# Patient Record
Sex: MALE | Race: BLACK OR AFRICAN AMERICAN | NOT HISPANIC OR LATINO | Employment: OTHER | URBAN - METROPOLITAN AREA
[De-identification: names, ages, dates, MRNs, and addresses within clinical notes are randomized per-mention and may not be internally consistent; named-entity substitution may affect disease eponyms.]

---

## 2019-04-21 ENCOUNTER — HOSPITAL ENCOUNTER (EMERGENCY)
Facility: HOSPITAL | Age: 34
Discharge: HOME | End: 2019-04-21
Attending: EMERGENCY MEDICINE | Admitting: EMERGENCY MEDICINE
Payer: MEDICARE

## 2019-04-21 VITALS
DIASTOLIC BLOOD PRESSURE: 73 MMHG | RESPIRATION RATE: 18 BRPM | OXYGEN SATURATION: 100 % | SYSTOLIC BLOOD PRESSURE: 117 MMHG | WEIGHT: 149.03 LBS | HEART RATE: 64 BPM | TEMPERATURE: 98 F

## 2019-04-21 DIAGNOSIS — N48.32 PRIAPISM DUE TO SICKLE CELL DISEASE (HCC): Primary | ICD-10-CM

## 2019-04-21 DIAGNOSIS — D57.1 PRIAPISM DUE TO SICKLE CELL DISEASE (HCC): Primary | ICD-10-CM

## 2019-04-21 LAB
ANION GAP SERPL CALCULATED.3IONS-SCNC: 14 MMOL/L (ref 4–13)
BASOPHILS # BLD AUTO: 0.05 THOUSANDS/ΜL (ref 0–0.1)
BASOPHILS NFR BLD AUTO: 0 % (ref 0–1)
BUN SERPL-MCNC: 6 MG/DL (ref 5–25)
CALCIUM SERPL-MCNC: 9 MG/DL (ref 8.3–10.1)
CHLORIDE SERPL-SCNC: 101 MMOL/L (ref 100–108)
CO2 SERPL-SCNC: 19 MMOL/L (ref 21–32)
CREAT SERPL-MCNC: 1.06 MG/DL (ref 0.6–1.3)
EOSINOPHIL # BLD AUTO: 0.06 THOUSAND/ΜL (ref 0–0.61)
EOSINOPHIL NFR BLD AUTO: 0 % (ref 0–6)
ERYTHROCYTE [DISTWIDTH] IN BLOOD BY AUTOMATED COUNT: 15.9 % (ref 11.6–15.1)
GFR SERPL CREATININE-BSD FRML MDRD: 106 ML/MIN/1.73SQ M
GLUCOSE SERPL-MCNC: 139 MG/DL (ref 65–140)
HCT VFR BLD AUTO: 32.5 % (ref 36.5–49.3)
HGB BLD-MCNC: 11.2 G/DL (ref 12–17)
IMM GRANULOCYTES # BLD AUTO: 0.08 THOUSAND/UL (ref 0–0.2)
IMM GRANULOCYTES NFR BLD AUTO: 1 % (ref 0–2)
LYMPHOCYTES # BLD AUTO: 3.32 THOUSANDS/ΜL (ref 0.6–4.47)
LYMPHOCYTES NFR BLD AUTO: 23 % (ref 14–44)
MCH RBC QN AUTO: 29.5 PG (ref 26.8–34.3)
MCHC RBC AUTO-ENTMCNC: 34.5 G/DL (ref 31.4–37.4)
MCV RBC AUTO: 86 FL (ref 82–98)
MONOCYTES # BLD AUTO: 1.66 THOUSAND/ΜL (ref 0.17–1.22)
MONOCYTES NFR BLD AUTO: 12 % (ref 4–12)
NEUTROPHILS # BLD AUTO: 9.12 THOUSANDS/ΜL (ref 1.85–7.62)
NEUTS SEG NFR BLD AUTO: 64 % (ref 43–75)
NRBC BLD AUTO-RTO: 1 /100 WBCS
PLATELET # BLD AUTO: 305 THOUSANDS/UL (ref 149–390)
PMV BLD AUTO: 10.3 FL (ref 8.9–12.7)
POTASSIUM SERPL-SCNC: 4.1 MMOL/L (ref 3.5–5.3)
RBC # BLD AUTO: 3.8 MILLION/UL (ref 3.88–5.62)
RETICS # AUTO: ABNORMAL 10*3/UL (ref 14356–105094)
RETICS # CALC: 4.45 % (ref 0.37–1.87)
SODIUM SERPL-SCNC: 134 MMOL/L (ref 136–145)
WBC # BLD AUTO: 14.29 THOUSAND/UL (ref 4.31–10.16)

## 2019-04-21 PROCEDURE — 36415 COLL VENOUS BLD VENIPUNCTURE: CPT | Performed by: EMERGENCY MEDICINE

## 2019-04-21 PROCEDURE — 96372 THER/PROPH/DIAG INJ SC/IM: CPT

## 2019-04-21 PROCEDURE — 96376 TX/PRO/DX INJ SAME DRUG ADON: CPT

## 2019-04-21 PROCEDURE — 85045 AUTOMATED RETICULOCYTE COUNT: CPT | Performed by: EMERGENCY MEDICINE

## 2019-04-21 PROCEDURE — 96374 THER/PROPH/DIAG INJ IV PUSH: CPT

## 2019-04-21 PROCEDURE — 54220 IRRG CRPRA CAVRNOSA PRIAPISM: CPT | Performed by: EMERGENCY MEDICINE

## 2019-04-21 PROCEDURE — 99284 EMERGENCY DEPT VISIT MOD MDM: CPT | Performed by: EMERGENCY MEDICINE

## 2019-04-21 PROCEDURE — 80048 BASIC METABOLIC PNL TOTAL CA: CPT | Performed by: EMERGENCY MEDICINE

## 2019-04-21 PROCEDURE — 85025 COMPLETE CBC W/AUTO DIFF WBC: CPT | Performed by: EMERGENCY MEDICINE

## 2019-04-21 PROCEDURE — 99284 EMERGENCY DEPT VISIT MOD MDM: CPT

## 2019-04-21 RX ORDER — HYDROMORPHONE HCL/PF 1 MG/ML
1 SYRINGE (ML) INJECTION ONCE
Status: COMPLETED | OUTPATIENT
Start: 2019-04-21 | End: 2019-04-21

## 2019-04-21 RX ORDER — LIDOCAINE HYDROCHLORIDE 10 MG/ML
10 INJECTION, SOLUTION EPIDURAL; INFILTRATION; INTRACAUDAL; PERINEURAL ONCE
Status: COMPLETED | OUTPATIENT
Start: 2019-04-21 | End: 2019-04-21

## 2019-04-21 RX ORDER — TERBUTALINE SULFATE 1 MG/ML
0.25 INJECTION, SOLUTION SUBCUTANEOUS ONCE
Status: COMPLETED | OUTPATIENT
Start: 2019-04-21 | End: 2019-04-21

## 2019-04-21 RX ADMIN — TERBUTALINE SULFATE 0.25 MG: 1 INJECTION, SOLUTION SUBCUTANEOUS at 13:24

## 2019-04-21 RX ADMIN — HYDROMORPHONE HYDROCHLORIDE 1 MG: 1 INJECTION, SOLUTION INTRAMUSCULAR; INTRAVENOUS; SUBCUTANEOUS at 12:54

## 2019-04-21 RX ADMIN — Medication 500 MCG: at 13:51

## 2019-04-21 RX ADMIN — HYDROMORPHONE HYDROCHLORIDE 1 MG: 1 INJECTION, SOLUTION INTRAMUSCULAR; INTRAVENOUS; SUBCUTANEOUS at 13:27

## 2019-04-21 RX ADMIN — LIDOCAINE HYDROCHLORIDE 10 ML: 10 INJECTION, SOLUTION EPIDURAL; INFILTRATION; INTRACAUDAL; PERINEURAL at 13:30

## 2019-10-26 ENCOUNTER — APPOINTMENT (EMERGENCY)
Dept: CT IMAGING | Facility: HOSPITAL | Age: 34
End: 2019-10-26

## 2019-10-26 ENCOUNTER — APPOINTMENT (EMERGENCY)
Dept: RADIOLOGY | Facility: HOSPITAL | Age: 34
End: 2019-10-26

## 2019-10-26 ENCOUNTER — HOSPITAL ENCOUNTER (EMERGENCY)
Facility: HOSPITAL | Age: 34
Discharge: HOME | End: 2019-10-27
Attending: EMERGENCY MEDICINE | Admitting: EMERGENCY MEDICINE

## 2019-10-26 VITALS
HEART RATE: 78 BPM | OXYGEN SATURATION: 100 % | TEMPERATURE: 97.8 F | SYSTOLIC BLOOD PRESSURE: 113 MMHG | WEIGHT: 144.18 LBS | DIASTOLIC BLOOD PRESSURE: 69 MMHG | RESPIRATION RATE: 16 BRPM
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
## 2019-10-26 DIAGNOSIS — S09.90XA INJURY OF HEAD, INITIAL ENCOUNTER: ICD-10-CM

## 2019-10-26 DIAGNOSIS — S63.90XA HAND SPRAIN: ICD-10-CM

## 2019-10-26 DIAGNOSIS — S16.1XXA STRAIN OF NECK MUSCLE, INITIAL ENCOUNTER: ICD-10-CM

## 2019-10-26 PROCEDURE — 99284 EMERGENCY DEPT VISIT MOD MDM: CPT | Performed by: EMERGENCY MEDICINE

## 2019-10-26 PROCEDURE — 70450 CT HEAD/BRAIN W/O DYE: CPT

## 2019-10-26 PROCEDURE — 72125 CT NECK SPINE W/O DYE: CPT

## 2019-10-26 PROCEDURE — 99284 EMERGENCY DEPT VISIT MOD MDM: CPT

## 2019-10-26 PROCEDURE — 71101 X-RAY EXAM UNILAT RIBS/CHEST: CPT

## 2019-10-26 RX ORDER — FOLIC ACID 1 MG/1
TABLET ORAL DAILY
COMMUNITY

## 2019-10-26 RX ORDER — ACETAMINOPHEN 325 MG/1
650 TABLET ORAL ONCE
Status: COMPLETED | OUTPATIENT
Start: 2019-10-26 | End: 2019-10-26

## 2019-10-26 RX ORDER — IBUPROFEN 600 MG/1
600 TABLET ORAL ONCE
Status: COMPLETED | OUTPATIENT
Start: 2019-10-26 | End: 2019-10-26

## 2019-10-26 RX ORDER — OXYCODONE AND ACETAMINOPHEN 10; 325 MG/1; MG/1
1 TABLET ORAL EVERY 4 HOURS PRN
COMMUNITY

## 2019-10-26 RX ADMIN — ACETAMINOPHEN 650 MG: 325 TABLET ORAL at 23:14

## 2019-10-26 RX ADMIN — IBUPROFEN 600 MG: 600 TABLET, FILM COATED ORAL at 23:41

## 2019-10-27 ENCOUNTER — APPOINTMENT (EMERGENCY)
Dept: RADIOLOGY | Facility: HOSPITAL | Age: 34
End: 2019-10-27

## 2019-10-27 ENCOUNTER — APPOINTMENT (EMERGENCY)
Dept: CT IMAGING | Facility: HOSPITAL | Age: 34
End: 2019-10-27

## 2019-10-27 PROCEDURE — 73130 X-RAY EXAM OF HAND: CPT

## 2019-10-27 PROCEDURE — 70486 CT MAXILLOFACIAL W/O DYE: CPT

## 2019-10-27 RX ORDER — IBUPROFEN 600 MG/1
600 TABLET ORAL EVERY 6 HOURS PRN
Qty: 30 TABLET | Refills: 0 | Status: SHIPPED | OUTPATIENT
Start: 2019-10-27

## 2019-10-27 RX ORDER — LIDOCAINE 50 MG/G
1 PATCH TOPICAL ONCE
Status: DISCONTINUED | OUTPATIENT
Start: 2019-10-27 | End: 2019-10-27 | Stop reason: HOSPADM

## 2019-10-27 RX ORDER — LIDOCAINE 50 MG/G
1 PATCH TOPICAL DAILY
Qty: 15 PATCH | Refills: 0 | Status: SHIPPED | OUTPATIENT
Start: 2019-10-27

## 2019-10-27 RX ADMIN — LIDOCAINE 1 PATCH: 50 PATCH TOPICAL at 01:31

## 2019-10-27 NOTE — ED NOTES
Prior to administration, the patient is stating that the tylenol "is not going to be strong enough"       Jasen Bose RN  10/26/19 4205

## 2019-10-27 NOTE — ED NOTES
The patient is requesting to speak with his doctor  He is stating that the tylenol did nothing for his pain       Pantera Douglas RN  10/26/19 4430

## 2019-10-27 NOTE — ED NOTES
RN was notified that the patient was in the hallway yelling  The patient was advised that we are waiting for the radiologist's report regarding his x-rays, as they have not yet come back  The patient stated "That's not true   she told me they were back right after she took the x-rays"  "I think you all are just holding it against me"  The patient was advised that he is receiving the same standard of care as other patients and that it can usually take anywhere from 30-40+ minutes for the radiology reports to return  The patient continually asked to speak with a doctor  He was advised that the provider was in another room with a patient, but would be over as soon as he was available  The provider was made aware of the situation       Eder Pedroza RN  10/27/19 9949

## 2019-10-27 NOTE — ED NOTES
Patient is not sitting on the edge of the bed stating that is pain is worse, rating it at a 9/10 on a 1-10 scale  Provider was made aware       Paul Lucas RN  10/27/19 8500
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
## 2019-10-27 NOTE — ED NOTES
Patient has removed the c-collar at this time  The patient was advised that if he did not leave the collar in place until his radiology scans were clear he could risk possible spinal injury  The patient stated that he "knew [he] was fine   I just had an x-ray"  The patient refused to put the collar back in place  "please miss just leave me alone   my head hurts and I am in pain  The provider was made aware of the situation       Yenifer Hernandez RN  10/27/19 0100

## 2019-10-27 NOTE — ED NOTES
Writer bedside to speak with patient, per request to file complaint  Patient upset that staff was "mumbling under their breath", reports that he could hear staff talking about him at nurses' station from lanier bed, stating "I'm the patient  I'm allowed to fucking complain  I'm paying their bills with my tax money"  Reports "You've gotta understand where I'm coming from  I have every right to curse and complain here    They don't"  Patient also upset that he was not prescribed "the right" pain medication  Shares hx of sickle cell disease, reports that he is chronically on narcotics, states "I take this shit all the time" (pulling bottle out of pants pocket)-pt unhappy that he was only prescribed "over the counter bullshit"  Writer and current ED provider addressed patient's non-improved pain status and lidocaine patch was recently applied  Writer apologized to patient that he felt like he was treated poorly today, but also expressed to patient that cursing is not tolerated in the hallways, and that medications must be ordered by a provider in order to be administered by nursing staff  Patient remains upset throughout  Contact information provided to patient to formally file complaint to [facility], [facility]  Patient reports "she's just a fucking nurses' assistant anyways    Don't worry    I'll see her on the road    The roads small    She gotta remember that"  Patient left department at this time with discharge instructions        Bibiana Zuniga RN  10/27/19 1069

## 2019-10-27 NOTE — ED NOTES
The patient can be heard mumbling and cursing in the hallway at this time       Alex Metz RN  10/27/19 3903

## 2020-06-27 ENCOUNTER — HOSPITAL ENCOUNTER (EMERGENCY)
Facility: HOSPITAL | Age: 35
Discharge: HOME | End: 2020-06-27
Attending: EMERGENCY MEDICINE
Payer: MEDICARE

## 2020-06-27 DIAGNOSIS — D57.1 PRIAPISM DUE TO SICKLE CELL DISEASE (HCC): Primary | ICD-10-CM

## 2020-06-27 DIAGNOSIS — N48.32 PRIAPISM DUE TO SICKLE CELL DISEASE (HCC): Primary | ICD-10-CM

## 2020-06-27 LAB
ANION GAP SERPL CALCULATED.3IONS-SCNC: 13 MMOL/L (ref 4–13)
BASOPHILS # BLD AUTO: 0.04 THOUSANDS/ΜL (ref 0–0.1)
BASOPHILS NFR BLD AUTO: 0 % (ref 0–1)
BUN SERPL-MCNC: 8 MG/DL (ref 5–25)
CALCIUM SERPL-MCNC: 8.8 MG/DL (ref 8.3–10.1)
CHLORIDE SERPL-SCNC: 96 MMOL/L (ref 100–108)
CO2 SERPL-SCNC: 23 MMOL/L (ref 21–32)
CREAT SERPL-MCNC: 0.83 MG/DL (ref 0.6–1.3)
EOSINOPHIL # BLD AUTO: 0.01 THOUSAND/ΜL (ref 0–0.61)
EOSINOPHIL NFR BLD AUTO: 0 % (ref 0–6)
ERYTHROCYTE [DISTWIDTH] IN BLOOD BY AUTOMATED COUNT: 14.6 % (ref 11.6–15.1)
GFR SERPL CREATININE-BSD FRML MDRD: 133 ML/MIN/1.73SQ M
GLUCOSE SERPL-MCNC: 91 MG/DL (ref 65–140)
HCT VFR BLD AUTO: 32.1 % (ref 36.5–49.3)
HGB BLD-MCNC: 11.6 G/DL (ref 12–17)
IMM GRANULOCYTES # BLD AUTO: 0.06 THOUSAND/UL (ref 0–0.2)
IMM GRANULOCYTES NFR BLD AUTO: 1 % (ref 0–2)
LYMPHOCYTES # BLD AUTO: 1.96 THOUSANDS/ΜL (ref 0.6–4.47)
LYMPHOCYTES NFR BLD AUTO: 18 % (ref 14–44)
MCH RBC QN AUTO: 32.2 PG (ref 26.8–34.3)
MCHC RBC AUTO-ENTMCNC: 36.1 G/DL (ref 31.4–37.4)
MCV RBC AUTO: 89 FL (ref 82–98)
MONOCYTES # BLD AUTO: 0.95 THOUSAND/ΜL (ref 0.17–1.22)
MONOCYTES NFR BLD AUTO: 9 % (ref 4–12)
NEUTROPHILS # BLD AUTO: 7.97 THOUSANDS/ΜL (ref 1.85–7.62)
NEUTS SEG NFR BLD AUTO: 72 % (ref 43–75)
NRBC BLD AUTO-RTO: 1 /100 WBCS
PLATELET # BLD AUTO: 252 THOUSANDS/UL (ref 149–390)
PMV BLD AUTO: 11.7 FL (ref 8.9–12.7)
POTASSIUM SERPL-SCNC: 3.6 MMOL/L (ref 3.5–5.3)
RBC # BLD AUTO: 3.6 MILLION/UL (ref 3.88–5.62)
SARS-COV-2 RNA RESP QL NAA+PROBE: NEGATIVE
SODIUM SERPL-SCNC: 132 MMOL/L (ref 136–145)
WBC # BLD AUTO: 10.99 THOUSAND/UL (ref 4.31–10.16)

## 2020-06-27 PROCEDURE — 85025 COMPLETE CBC W/AUTO DIFF WBC: CPT | Performed by: EMERGENCY MEDICINE

## 2020-06-27 PROCEDURE — 96374 THER/PROPH/DIAG INJ IV PUSH: CPT

## 2020-06-27 PROCEDURE — 96372 THER/PROPH/DIAG INJ SC/IM: CPT

## 2020-06-27 PROCEDURE — 36415 COLL VENOUS BLD VENIPUNCTURE: CPT | Performed by: EMERGENCY MEDICINE

## 2020-06-27 PROCEDURE — 99284 EMERGENCY DEPT VISIT MOD MDM: CPT

## 2020-06-27 PROCEDURE — 80048 BASIC METABOLIC PNL TOTAL CA: CPT | Performed by: EMERGENCY MEDICINE

## 2020-06-27 PROCEDURE — 87635 SARS-COV-2 COVID-19 AMP PRB: CPT | Performed by: EMERGENCY MEDICINE

## 2020-06-27 PROCEDURE — 96375 TX/PRO/DX INJ NEW DRUG ADDON: CPT

## 2020-06-27 PROCEDURE — 99285 EMERGENCY DEPT VISIT HI MDM: CPT | Performed by: EMERGENCY MEDICINE

## 2020-06-27 RX ORDER — MORPHINE SULFATE 4 MG/ML
4 INJECTION, SOLUTION INTRAMUSCULAR; INTRAVENOUS
Status: DISCONTINUED | OUTPATIENT
Start: 2020-06-27 | End: 2020-06-27

## 2020-06-27 RX ORDER — HYDROMORPHONE HCL/PF 1 MG/ML
1 SYRINGE (ML) INJECTION ONCE
Status: COMPLETED | OUTPATIENT
Start: 2020-06-27 | End: 2020-06-27

## 2020-06-27 RX ORDER — HYDROMORPHONE HCL/PF 1 MG/ML
0.5 SYRINGE (ML) INJECTION
Status: COMPLETED | OUTPATIENT
Start: 2020-06-27 | End: 2020-06-27

## 2020-06-27 RX ORDER — PSEUDOEPHEDRINE HCL 60 MG/1
60 TABLET ORAL AS NEEDED
Qty: 10 TABLET | Refills: 0 | Status: SHIPPED | OUTPATIENT
Start: 2020-06-27

## 2020-06-27 RX ORDER — TERBUTALINE SULFATE 1 MG/ML
0.5 INJECTION, SOLUTION SUBCUTANEOUS ONCE
Status: COMPLETED | OUTPATIENT
Start: 2020-06-27 | End: 2020-06-27

## 2020-06-27 RX ORDER — PSEUDOEPHEDRINE HCL 60 MG/1
60 TABLET ORAL ONCE
Status: COMPLETED | OUTPATIENT
Start: 2020-06-27 | End: 2020-06-27

## 2020-06-27 RX ORDER — MORPHINE SULFATE 4 MG/ML
10 INJECTION, SOLUTION INTRAMUSCULAR; INTRAVENOUS ONCE
Status: COMPLETED | OUTPATIENT
Start: 2020-06-27 | End: 2020-06-27

## 2020-06-27 RX ORDER — DIPHENHYDRAMINE HYDROCHLORIDE 50 MG/ML
25 INJECTION INTRAMUSCULAR; INTRAVENOUS ONCE
Status: COMPLETED | OUTPATIENT
Start: 2020-06-27 | End: 2020-06-27

## 2020-06-27 RX ADMIN — MORPHINE SULFATE 10 MG: 4 INJECTION INTRAVENOUS at 09:48

## 2020-06-27 RX ADMIN — PSEUDOEPHEDRINE HYDROCHLORIDE 60 MG: 60 TABLET ORAL at 09:52

## 2020-06-27 RX ADMIN — HYDROMORPHONE HYDROCHLORIDE 0.5 MG: 1 INJECTION, SOLUTION INTRAMUSCULAR; INTRAVENOUS; SUBCUTANEOUS at 11:06

## 2020-06-27 RX ADMIN — PHENYLEPHRINE HYDROCHLORIDE: 10 INJECTION INTRAVENOUS at 11:33

## 2020-06-27 RX ADMIN — TERBUTALINE SULFATE 0.5 MG: 1 INJECTION, SOLUTION SUBCUTANEOUS at 09:46

## 2020-06-27 RX ADMIN — DIPHENHYDRAMINE HYDROCHLORIDE 25 MG: 50 INJECTION, SOLUTION INTRAMUSCULAR; INTRAVENOUS at 10:03

## 2020-06-27 RX ADMIN — HYDROMORPHONE HYDROCHLORIDE 1 MG: 1 INJECTION, SOLUTION INTRAMUSCULAR; INTRAVENOUS; SUBCUTANEOUS at 10:37

## 2020-06-28 VITALS
DIASTOLIC BLOOD PRESSURE: 83 MMHG | HEART RATE: 87 BPM | SYSTOLIC BLOOD PRESSURE: 136 MMHG | TEMPERATURE: 98.6 F | WEIGHT: 144.18 LBS | RESPIRATION RATE: 16 BRPM | OXYGEN SATURATION: 94 %

## 2021-08-20 ENCOUNTER — APPOINTMENT (EMERGENCY)
Dept: RADIOLOGY | Facility: HOSPITAL | Age: 36
End: 2021-08-20
Payer: MEDICARE

## 2021-08-20 ENCOUNTER — HOSPITAL ENCOUNTER (EMERGENCY)
Facility: HOSPITAL | Age: 36
Discharge: HOME | End: 2021-08-20
Attending: EMERGENCY MEDICINE | Admitting: EMERGENCY MEDICINE
Payer: MEDICARE

## 2021-08-20 VITALS
RESPIRATION RATE: 20 BRPM | HEART RATE: 86 BPM | OXYGEN SATURATION: 98 % | SYSTOLIC BLOOD PRESSURE: 140 MMHG | WEIGHT: 143.3 LBS | TEMPERATURE: 98.2 F | DIASTOLIC BLOOD PRESSURE: 80 MMHG

## 2021-08-20 DIAGNOSIS — R11.2 NAUSEA AND VOMITING: Primary | ICD-10-CM

## 2021-08-20 DIAGNOSIS — D57.00 VASOOCCLUSIVE SICKLE CELL CRISIS (HCC): ICD-10-CM

## 2021-08-20 LAB
ANION GAP SERPL CALCULATED.3IONS-SCNC: 5 MMOL/L (ref 4–13)
BACTERIA UR QL AUTO: ABNORMAL /HPF
BILIRUB UR QL STRIP: NEGATIVE
BUN SERPL-MCNC: 15 MG/DL (ref 5–25)
CALCIUM SERPL-MCNC: 8.6 MG/DL (ref 8.3–10.1)
CHLORIDE SERPL-SCNC: 103 MMOL/L (ref 100–108)
CLARITY UR: CLEAR
CO2 SERPL-SCNC: 30 MMOL/L (ref 21–32)
COLOR UR: YELLOW
CREAT SERPL-MCNC: 1 MG/DL (ref 0.6–1.3)
ERYTHROCYTE [DISTWIDTH] IN BLOOD BY AUTOMATED COUNT: 14.9 % (ref 11.6–15.1)
GFR SERPL CREATININE-BSD FRML MDRD: 112 ML/MIN/1.73SQ M
GLUCOSE SERPL-MCNC: 106 MG/DL (ref 65–140)
GLUCOSE UR STRIP-MCNC: NEGATIVE MG/DL
HCT VFR BLD AUTO: 33.8 % (ref 36.5–49.3)
HGB BLD-MCNC: 12.6 G/DL (ref 12–17)
HGB UR QL STRIP.AUTO: ABNORMAL
KETONES UR STRIP-MCNC: NEGATIVE MG/DL
LEUKOCYTE ESTERASE UR QL STRIP: NEGATIVE
MCH RBC QN AUTO: 32.2 PG (ref 26.8–34.3)
MCHC RBC AUTO-ENTMCNC: 37.3 G/DL (ref 31.4–37.4)
MCV RBC AUTO: 86 FL (ref 82–98)
NITRITE UR QL STRIP: NEGATIVE
NON-SQ EPI CELLS URNS QL MICRO: ABNORMAL /HPF
PH UR STRIP.AUTO: 5.5 [PH] (ref 4.5–8)
PLATELET # BLD AUTO: 297 THOUSANDS/UL (ref 149–390)
PMV BLD AUTO: 10.2 FL (ref 8.9–12.7)
POTASSIUM SERPL-SCNC: 4.4 MMOL/L (ref 3.5–5.3)
PROT UR STRIP-MCNC: NEGATIVE MG/DL
RBC # BLD AUTO: 3.91 MILLION/UL (ref 3.88–5.62)
RBC #/AREA URNS AUTO: ABNORMAL /HPF
SODIUM SERPL-SCNC: 138 MMOL/L (ref 136–145)
SP GR UR STRIP.AUTO: 1.02 (ref 1–1.03)
UROBILINOGEN UR QL STRIP.AUTO: 0.2 E.U./DL
WBC # BLD AUTO: 14.98 THOUSAND/UL (ref 4.31–10.16)
WBC #/AREA URNS AUTO: ABNORMAL /HPF

## 2021-08-20 PROCEDURE — 96375 TX/PRO/DX INJ NEW DRUG ADDON: CPT

## 2021-08-20 PROCEDURE — 96365 THER/PROPH/DIAG IV INF INIT: CPT

## 2021-08-20 PROCEDURE — 80048 BASIC METABOLIC PNL TOTAL CA: CPT | Performed by: EMERGENCY MEDICINE

## 2021-08-20 PROCEDURE — 85027 COMPLETE CBC AUTOMATED: CPT | Performed by: EMERGENCY MEDICINE

## 2021-08-20 PROCEDURE — 96376 TX/PRO/DX INJ SAME DRUG ADON: CPT

## 2021-08-20 PROCEDURE — 81001 URINALYSIS AUTO W/SCOPE: CPT

## 2021-08-20 PROCEDURE — 99285 EMERGENCY DEPT VISIT HI MDM: CPT | Performed by: EMERGENCY MEDICINE

## 2021-08-20 PROCEDURE — 71046 X-RAY EXAM CHEST 2 VIEWS: CPT

## 2021-08-20 PROCEDURE — 36415 COLL VENOUS BLD VENIPUNCTURE: CPT | Performed by: EMERGENCY MEDICINE

## 2021-08-20 PROCEDURE — 99284 EMERGENCY DEPT VISIT MOD MDM: CPT

## 2021-08-20 RX ORDER — METOCLOPRAMIDE HYDROCHLORIDE 5 MG/ML
10 INJECTION INTRAMUSCULAR; INTRAVENOUS ONCE
Status: COMPLETED | OUTPATIENT
Start: 2021-08-20 | End: 2021-08-20

## 2021-08-20 RX ORDER — MORPHINE SULFATE 4 MG/ML
4 INJECTION, SOLUTION INTRAMUSCULAR; INTRAVENOUS ONCE
Status: COMPLETED | OUTPATIENT
Start: 2021-08-20 | End: 2021-08-20

## 2021-08-20 RX ORDER — METOCLOPRAMIDE 10 MG/1
10 TABLET ORAL 4 TIMES DAILY PRN
Qty: 12 TABLET | Refills: 0 | Status: SHIPPED | OUTPATIENT
Start: 2021-08-20 | End: 2021-08-23

## 2021-08-20 RX ORDER — ONDANSETRON 2 MG/ML
4 INJECTION INTRAMUSCULAR; INTRAVENOUS ONCE
Status: COMPLETED | OUTPATIENT
Start: 2021-08-20 | End: 2021-08-20

## 2021-08-20 RX ORDER — MORPHINE SULFATE 4 MG/ML
10 INJECTION, SOLUTION INTRAMUSCULAR; INTRAVENOUS ONCE
Status: COMPLETED | OUTPATIENT
Start: 2021-08-20 | End: 2021-08-20

## 2021-08-20 RX ADMIN — MORPHINE SULFATE 4 MG: 4 INJECTION INTRAVENOUS at 12:44

## 2021-08-20 RX ADMIN — ONDANSETRON 4 MG: 2 INJECTION INTRAMUSCULAR; INTRAVENOUS at 12:06

## 2021-08-20 RX ADMIN — MORPHINE SULFATE 10 MG: 4 INJECTION INTRAVENOUS at 12:06

## 2021-08-20 RX ADMIN — METOCLOPRAMIDE 10 MG: 5 INJECTION, SOLUTION INTRAMUSCULAR; INTRAVENOUS at 14:03

## 2021-08-20 RX ADMIN — SODIUM CHLORIDE, SODIUM LACTATE, POTASSIUM CHLORIDE, AND CALCIUM CHLORIDE 500 ML: .6; .31; .03; .02 INJECTION, SOLUTION INTRAVENOUS at 12:06

## 2021-08-20 NOTE — ED PROVIDER NOTES
Emergency Department Note- Liam Gomez 29 y o  male MRN: [id number]    Unit/Bed#: ED 23 Encounter: [id number]        History of Present Illness     Patient is a 45-year-old male with a history of sickle cell disease, last evening had 1 beer and 1 mixed drink, went to bed feeling okay, woke this morning at 8:00 a m  And noticed that he had mild pain in his back and his right hip like his typical pain crisis as well as he had a priapism  He took Sudafed which resolved the priapism  He has also had several episodes of nonbloody, nonbilious emesis  No travel history, sick contacts, no recent hospitalizations, no diarrhea or constipation  No dysuria or hematuria  No chest pain or shortness of breath  He says this definitely feels like his typical pain crisis in terms of the hip and back discomfort  REVIEW OF SYSTEMS     Constitutional:  No recent weight  gains or losses   Eyes:  No visual changes   ENT:  No tinnitus or hearing changes   Cardiac: No chest pain or palpitations   Respiratory:  No cough or shortness of breath   Abdominal:  No nausea or vomiting   Urinary: As per HPI   Hematologic: No easy bruising or bleeding   Skin: No rash   Musculoskeletal: As per HPI   Neurologic: As per HPI   Psychiatric: No mood changes      Historical Information   Past Medical History:   Diagnosis Date    Priapism due to sickle cell disease ([facility])     [facility]      Past Surgical History:   Procedure Laterality Date    NO PAST SURGERIES       Social History   Social History     Substance and Sexual Activity   Alcohol Use Yes    Comment: socially     Social History     Substance and Sexual Activity   Drug Use Never     Social History     Tobacco Use   Smoking Status Current Every Day Smoker   Smokeless Tobacco Never Used     Family History: History reviewed  No pertinent family history  MEDICATIONS:  No current facility-administered medications on file prior to encounter       Current Outpatient Medications on File Prior to Encounter   Medication Sig Dispense Refill    pseudoephedrine (SUDAFED) 60 mg tablet Take 1 tablet (60 mg total) by mouth as needed (priapism (painful erection)) 10 tablet 0    folic acid (FOLVITE) 1 mg tablet Take by mouth daily      ibuprofen (MOTRIN) 600 mg tablet Take 1 tablet (600 mg total) by mouth every 6 (six) hours as needed for moderate pain 30 tablet 0    lidocaine (LIDODERM) 5 % Apply 1 patch topically daily Remove & Discard patch within 12 hours or as directed by MD 15 patch 0    oxyCODONE-acetaminophen (PERCOCET)  mg per tablet Take 1 tablet by mouth every 4 (four) hours as needed for moderate pain 30 mg q4hrs PRN       ALLERGIES:  No Known Allergies    Vitals:    08/20/21 1101 08/20/21 1249 08/20/21 1453   BP: 121/76 103/60 140/80   TempSrc: Oral     Pulse: 94 80 86   Resp: 18 16 20   Patient Position - Orthostatic VS: Sitting Lying Sitting   Temp: 98 2 °F (36 8 °C)         PHYSICAL EXAM    General:  Patient is well-appearing  Head:  Atraumatic  Eyes:  Conjunctiva pink  ENT:  Mucous membranes are moist  Neck:  Supple  Cardiac:  S1-S2, without murmurs  Lungs:  Clear to auscultation bilaterally  Abdomen:  Soft, nontender, normal bowel sounds, no CVA tenderness  :  Well-developed male, does not currently have a priapism or any genital lesions  Extremities:  Normal range of motion , noticed joint or warm or red or hot  Back: No warmth or redness to the back  There is no CVA tenderness, no spinal tenderness, no warmth or fluctuance  Neurologic:  Awake, fluent speech, normal comprehension, sensation intact and symmetric in the b/l  legs  Strength is 5/5 at the bilateral hips, knees, ankles, reflexes are 2/4 at the bilateral knees and ankles  Can dorsiflex & plantarflex great toes bilaterally without difficulty, no saddle anesthesia, negative straight leg raise bilaterally   AAOx3  Skin:  Pink warm and dry, no rash  Psychiatric:  Alert, pleasant, cooperative      Labs Reviewed   CBC AND PLATELET - Abnormal       Result Value Ref Range Status    WBC 14 98 (*) 4 31 - 10 16 Thousand/uL Final    RBC 3 91  3 88 - 5 62 Million/uL Final    Hemoglobin 12 6  12 0 - 17 0 g/dL Final    Hematocrit 33 8 (*) 36 5 - 49 3 % Final    MCV 86  82 - 98 fL Final    MCH 32 2  26 8 - 34 3 pg Final    MCHC 37 3  31 4 - 37 4 g/dL Final    RDW 14 9  11 6 - 15 1 % Final    Platelets 614  019 - 390 Thousands/uL Final    MPV 10 2  8 9 - 12 7 fL Final   URINE MACROSCOPIC, POC - Abnormal    Color, UA Yellow   Final    Clarity, UA Clear   Final    pH, UA 5 5  4 5 - 8 0 Final    Leukocytes, UA Negative  Negative Final    Nitrite, UA Negative  Negative Final    Protein, UA Negative  Negative mg/dl Final    Glucose, UA Negative  Negative mg/dl Final    Ketones, UA Negative  Negative mg/dl Final    Urobilinogen, UA 0 2  0 2, 1 0 E U /dl E U /dl Final    Bilirubin, UA Negative  Negative Final    Blood, UA Trace (*) Negative Final    Specific Gravity, UA 1 025  1 003 - 1 030 Final    Narrative:     CLINITEK RESULT   BASIC METABOLIC PANEL    Sodium 726  136 - 145 mmol/L Final    Potassium 4 4  3 5 - 5 3 mmol/L Final    Chloride 103  100 - 108 mmol/L Final    CO2 30  21 - 32 mmol/L Final    ANION GAP 5  4 - 13 mmol/L Final    BUN 15  5 - 25 mg/dL Final    Creatinine 1 00  0 60 - 1 30 mg/dL Final    Comment: Standardized to IDMS reference method    Glucose 106  65 - 140 mg/dL Final    Comment: If the patient is fasting, the ADA then defines impaired fasting glucose as > 100 mg/dL and diabetes as > or equal to 123 mg/dL  Specimen collection should occur prior to Sulfasalazine administration due to the potential for falsely depressed results  Specimen collection should occur prior to Sulfapyridine administration due to the potential for falsely elevated results      Calcium 8 6  8 3 - 10 1 mg/dL Final    eGFR 112  ml/min/1 73sq m Final    Narrative:     [place] guidelines for Chronic Kidney Disease (CKD):                     Stage 1 with normal or high GFR (GFR > 90 mL/min/1 73 square meters)                    Stage 2 Mild CKD (GFR = 60-89 mL/min/1 73 square meters)                    Stage 3A Moderate CKD (GFR = 45-59 mL/min/1 73 square meters)                    Stage 3B Moderate CKD (GFR = 30-44 mL/min/1 73 square meters)                    Stage 4 Severe CKD (GFR = 15-29 mL/min/1 73 square meters)                    Stage 5 End Stage CKD (GFR <15 mL/min/1 73 square meters)                  Note: GFR calculation is accurate only with a steady state creatinine       Medications   ondansetron (ZOFRAN) injection 4 mg (4 mg Intravenous Given 8/20/21 1206)   lactated ringers bolus 500 mL (0 mL Intravenous Stopped 8/20/21 1247)   morphine (PF) 4 mg/mL injection 10 mg (10 mg Intravenous Given 8/20/21 1206)   morphine (PF) 4 mg/mL injection 4 mg (4 mg Intravenous Given 8/20/21 1244)   metoclopramide (REGLAN) injection 10 mg (10 mg Intravenous Given 8/20/21 1403)       XR chest pa & lateral   ED Interpretation   Chest x-ray interpreted me shows no acute cardiopulmonary disease      Final Result      No acute cardiopulmonary disease  Workstation performed: [id number]             ED Course as of Aug 20 1623   Fri Aug 20, 2021   1210 On reassessment there was no change from the above findings  1226 On reassessment the patient said his nausea had improved, no additional vomiting was still having some mild discomfort from his sickle cell  Overall improved but indicated he would like additional pain medication      1357 On reassessment, patient said his nausea had returned, had 1 episode of nonbloody, nonbilious emesis      1444 On reassessment, patient said he was feeling much better  No nausea or vomiting  Assessment/Plan     ED Medical Decision Making:    I believe the patient most likely has a self-limiting nausea vomiting syndrome   I do not believe it represents significant acute pathology  The patient was counseled regarding the worsening signs look out for, as well as the possibility that this might be early presentation of more significant pathology  The patient understood this and indicated they would be able to be seen for followup and return if there are worsening signs or symptoms  Patient also appears to have a sickle cell crisis, do not believe this is acute chest syndrome, or an infectious etiology  Supportive care, importance of follow-up and return precautions were discussed with the patient, who expressed understanding  Time reflects when diagnosis was documented in both MDM as applicable and the Disposition within this note       Time User Action Codes Description Comment    8/20/2021  2:45 PM Swati Grady [R11 2] Nausea and vomiting     8/20/2021  2:45 PM Swati Grady [D57 00] Vasoocclusive sickle cell crisis [facility])           ED Disposition       ED Disposition Condition Date/Time Comment    Discharge Stable Fri Aug 20, 2021  2:45 PM Rina Quinonez discharge to home/self care                  Follow-up Information       Follow up With Specialties Details Why Contact Info    Your primary care physician  Schedule an appointment as soon as possible for a visit in 5 days              Discharge Medication List as of 8/20/2021  2:46 PM        START taking these medications    Details   metoclopramide (REGLAN) 10 mg tablet Take 1 tablet (10 mg total) by mouth 4 (four) times a day as needed (nausea / vomiting) for up to 3 days, Starting Fri 8/20/2021, Until Mon 8/23/2021 at 2359, Emily 73, DO  08/20/21 1624

## 2021-08-20 NOTE — DISCHARGE INSTRUCTIONS
Your given a medication in the emergency department which may affect her ability to drive,  operate machinery or perform other tasks requiring concentration  You should not drive, or do other similar tasks, for the next 6 hours until the effects of the medication have worn off  Acute Nausea and Vomiting   DISCHARGE INSTRUCTIONS:   Return to the emergency department if:   You see blood in your vomit or your bowel movements  You have sudden, severe pain in your chest and upper abdomen after hard vomiting or retching  You have swelling in your neck and chest      You are dizzy, cold, and thirsty and your eyes and mouth are dry  You are urinating very little or not at all  You have muscle weakness, leg cramps, and trouble breathing  Your heart is beating much faster than normal      You continue to vomit for more than 48 hours  Contact your healthcare provider if:   You have frequent dry heaves (vomiting but nothing comes out)  Your nausea and vomiting does not get better or go away after you use medicine  You have questions or concerns about your condition or treatment  Sickle Cell Crisis   DISCHARGE INSTRUCTIONS:   Call 911 for any of the following: You have shortness of breath or chest pain  You are a man and have an erection that is painful and does not go away  You lose vision in one or both eyes  Return to the emergency department if:   You have a fever  You feel like you cannot cope with your pain, or you feel like hurting yourself  You have behavior changes, a seizure, or faint  You have abdominal pain, nausea, or vomiting  You have a headache that is worse or different from those that you have had in the past      You have new weakness or numbness in your arm, leg, or face  You have new pain in any part of your body  Your urine is dark and you are urinating less than usual or not at all  You are dizzy, lightheaded, or faint    You are concerned about anything else  Prevent a sickle cell crisis:   Drink liquids as directed  Dehydration can increase your risk for a sickle cell crisis  Balance rest and exercise  Rest during a sickle cell crisis  Over time, increase your activity to a moderate amount  Exercise regularly  Avoid exercise or activities that can cause injury, such as football  Ask about the best exercise plan for you  Stay out of the cold  Do not go quickly from a warm place to a cold place  Do not go swimming in cold water  Stay warm in the winter  Do not smoke cigarettes or drink alcohol  These increase your risk for a sickle cell crisis  Nicotine and other chemicals in cigarettes and cigars can cause lung damage  Ask your healthcare provider for information if you currently smoke and need help to quit  E-cigarettes or smokeless tobacco still contain nicotine  Talk to your healthcare provider before you use these products

## 2024-07-06 ENCOUNTER — APPOINTMENT (EMERGENCY)
Dept: RADIOLOGY | Facility: HOSPITAL | Age: 39
End: 2024-07-06
Payer: MEDICARE

## 2024-07-06 ENCOUNTER — HOSPITAL ENCOUNTER (EMERGENCY)
Facility: HOSPITAL | Age: 39
Discharge: HOME | End: 2024-07-06
Attending: EMERGENCY MEDICINE | Admitting: EMERGENCY MEDICINE
Payer: MEDICARE

## 2024-07-06 VITALS
RESPIRATION RATE: 18 BRPM | HEART RATE: 79 BPM | DIASTOLIC BLOOD PRESSURE: 90 MMHG | WEIGHT: 140.65 LBS | SYSTOLIC BLOOD PRESSURE: 122 MMHG | OXYGEN SATURATION: 98 % | TEMPERATURE: 98.4 F

## 2024-07-06 DIAGNOSIS — D57.1 SICKLE CELL ANEMIA (HCC): ICD-10-CM

## 2024-07-06 DIAGNOSIS — R07.81 RIB PAIN ON RIGHT SIDE: Primary | ICD-10-CM

## 2024-07-06 LAB
ANION GAP SERPL CALCULATED.3IONS-SCNC: 4 MMOL/L (ref 4–13)
ANISOCYTOSIS BLD QL SMEAR: PRESENT
BASOPHILS # BLD MANUAL: 0 THOUSAND/UL (ref 0–0.1)
BASOPHILS NFR MAR MANUAL: 0 % (ref 0–1)
BUN SERPL-MCNC: 12 MG/DL (ref 5–25)
CALCIUM SERPL-MCNC: 9.2 MG/DL (ref 8.4–10.2)
CHLORIDE SERPL-SCNC: 104 MMOL/L (ref 96–108)
CO2 SERPL-SCNC: 27 MMOL/L (ref 21–32)
CREAT SERPL-MCNC: 0.84 MG/DL (ref 0.6–1.3)
EOSINOPHIL # BLD MANUAL: 0 THOUSAND/UL (ref 0–0.4)
EOSINOPHIL NFR BLD MANUAL: 0 % (ref 0–6)
ERYTHROCYTE [DISTWIDTH] IN BLOOD BY AUTOMATED COUNT: 14.2 % (ref 11.6–15.1)
GFR SERPL CREATININE-BSD FRML MDRD: 111 ML/MIN/1.73SQ M
GLUCOSE SERPL-MCNC: 105 MG/DL (ref 65–140)
HCT VFR BLD AUTO: 32.5 % (ref 36.5–49.3)
HELMET CELLS BLD QL SMEAR: PRESENT
HGB BLD-MCNC: 12 G/DL (ref 12–17)
LYMPHOCYTES # BLD AUTO: 74 % (ref 14–44)
LYMPHOCYTES # BLD AUTO: 9.49 THOUSAND/UL (ref 0.6–4.47)
MACROCYTES BLD QL AUTO: PRESENT
MCH RBC QN AUTO: 31.6 PG (ref 26.8–34.3)
MCHC RBC AUTO-ENTMCNC: 36.9 G/DL (ref 31.4–37.4)
MCV RBC AUTO: 86 FL (ref 82–98)
MONOCYTES # BLD AUTO: 0.51 THOUSAND/UL (ref 0–1.22)
MONOCYTES NFR BLD: 4 % (ref 4–12)
NEUTROPHILS # BLD MANUAL: 2.66 THOUSAND/UL (ref 1.85–7.62)
NEUTS SEG NFR BLD AUTO: 21 % (ref 43–75)
PLATELET # BLD AUTO: 240 THOUSANDS/UL (ref 149–390)
PLATELET BLD QL SMEAR: ADEQUATE
PMV BLD AUTO: 11.6 FL (ref 8.9–12.7)
POTASSIUM SERPL-SCNC: 4.4 MMOL/L (ref 3.5–5.3)
RBC # BLD AUTO: 3.8 MILLION/UL (ref 3.88–5.62)
RETICS # AUTO: ABNORMAL 10*3/UL (ref 14356–105094)
RETICS # CALC: 3.98 % (ref 0.37–1.87)
SODIUM SERPL-SCNC: 135 MMOL/L (ref 135–147)
TARGETS BLD QL SMEAR: PRESENT
VARIANT LYMPHS # BLD AUTO: 1 %
WBC # BLD AUTO: 12.65 THOUSAND/UL (ref 4.31–10.16)

## 2024-07-06 PROCEDURE — 96365 THER/PROPH/DIAG IV INF INIT: CPT

## 2024-07-06 PROCEDURE — 99285 EMERGENCY DEPT VISIT HI MDM: CPT | Performed by: EMERGENCY MEDICINE

## 2024-07-06 PROCEDURE — 80048 BASIC METABOLIC PNL TOTAL CA: CPT | Performed by: EMERGENCY MEDICINE

## 2024-07-06 PROCEDURE — 99283 EMERGENCY DEPT VISIT LOW MDM: CPT

## 2024-07-06 PROCEDURE — 85045 AUTOMATED RETICULOCYTE COUNT: CPT | Performed by: EMERGENCY MEDICINE

## 2024-07-06 PROCEDURE — 36415 COLL VENOUS BLD VENIPUNCTURE: CPT | Performed by: EMERGENCY MEDICINE

## 2024-07-06 PROCEDURE — 71046 X-RAY EXAM CHEST 2 VIEWS: CPT

## 2024-07-06 PROCEDURE — 85027 COMPLETE CBC AUTOMATED: CPT | Performed by: EMERGENCY MEDICINE

## 2024-07-06 PROCEDURE — 71100 X-RAY EXAM RIBS UNI 2 VIEWS: CPT

## 2024-07-06 PROCEDURE — 85007 BL SMEAR W/DIFF WBC COUNT: CPT | Performed by: EMERGENCY MEDICINE

## 2024-07-06 RX ORDER — SODIUM CHLORIDE, SODIUM GLUCONATE, SODIUM ACETATE, POTASSIUM CHLORIDE, MAGNESIUM CHLORIDE, SODIUM PHOSPHATE, DIBASIC, AND POTASSIUM PHOSPHATE .53; .5; .37; .037; .03; .012; .00082 G/100ML; G/100ML; G/100ML; G/100ML; G/100ML; G/100ML; G/100ML
1000 INJECTION, SOLUTION INTRAVENOUS ONCE
Status: COMPLETED | OUTPATIENT
Start: 2024-07-06 | End: 2024-07-06

## 2024-07-06 RX ADMIN — SODIUM CHLORIDE, SODIUM GLUCONATE, SODIUM ACETATE, POTASSIUM CHLORIDE, MAGNESIUM CHLORIDE, SODIUM PHOSPHATE, DIBASIC, AND POTASSIUM PHOSPHATE 1000 ML: .53; .5; .37; .037; .03; .012; .00082 INJECTION, SOLUTION INTRAVENOUS at 14:47

## 2024-07-06 NOTE — ED NOTES
"Pt state she vomited a couple times this morning and then started with right rib pain.  Pt reports current pain only when he cough sor sneezes, or if he \"really hugs/squeezes\" something.  No pain with deep breaths or movement.       Amarilys Osei RN  07/06/24 9049    "

## 2024-07-06 NOTE — ED PROVIDER NOTES
"History  Chief Complaint   Patient presents with    Rib Pain     Pt reports he started this morning with right sided rib cage pain after he vomited this morning. 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  Pt reports having several episodes of nonbloody nonbilious emesis yesterday, following his last episode of vomiting he developed acute onset rib pain.  Pt states nausea has since resolved, he is now tolerating PO.  He also had a few episodes of nonbloody diarrhea.  Pt otherwise fever, chills chest pain, SOB, abd pain, peripheral edema and rashes.  Pt took a dose of his oxycodone prior to arrival with some relief of his pain.      History provided by:  Patient and medical records      Prior to Admission Medications   Prescriptions Last Dose Informant Patient Reported? Taking?   folic acid (FOLVITE) 1 mg tablet   Yes No   Sig: Take by mouth daily   ibuprofen (MOTRIN) 600 mg tablet   No No   Sig: Take 1 tablet (600 mg total) by mouth every 6 (six) hours as needed for moderate pain   lidocaine (LIDODERM) 5 %   No No   Sig: Apply 1 patch topically daily Remove & Discard patch within 12 hours or as directed by MD   oxyCODONE-acetaminophen (PERCOCET)  mg per tablet   Yes No   Sig: Take 1 tablet by mouth every 4 (four) hours as needed for moderate pain 30 mg q4hrs PRN   pseudoephedrine (SUDAFED) 60 mg tablet   No No   Sig: Take 1 tablet (60 mg total) by mouth as needed (priapism (painful erection))      Facility-Administered Medications: None       Past Medical History:   Diagnosis Date    Priapism due to sickle cell disease  (HCC)     Sickle cell anemia without crisis (HCC)        Past Surgical History:   Procedure Laterality Date    NO PAST SURGERIES   "       History reviewed. No pertinent family history.  I have reviewed and agree with the history as documented.    E-Cigarette/Vaping    E-Cigarette Use Never User      E-Cigarette/Vaping Substances     Social History     Tobacco Use    Smoking status: Every Day    Smokeless tobacco: Never   Vaping Use    Vaping status: Never Used   Substance Use Topics    Alcohol use: Yes     Comment: socially    Drug use: Never       Review of Systems   Gastrointestinal:  Positive for nausea and vomiting.   Musculoskeletal:  Positive for arthralgias (right inferior rib pain).   All other systems reviewed and are negative.      Physical Exam  Physical Exam  General Appearance: alert and oriented, nad, non toxic appearing  Skin:  Warm, dry, intact.  No cyanosis  HEENT: Atraumatic, normocephalic.  No eye drainage.  Normal hearing.  Moist mucous membranes.    Neck: Supple, trachea midline  Cardiac: RRR; no murmurs, rub, gallops.  No pedal edema, 2+ pulses  Pulmonary: lungs CTAB; no wheezes, rales, rhonchi.  Pinpoint tenderness over inferior lateral right ribs.  Gastrointestinal: abdomen soft, nontender, nondistended; no guarding or rebound tenderness; good bowel sounds, no mass or bruits  Extremities:  No deformities.  No calf tenderness, no clubbing  Neuro:  no focal motor or sensory deficits, CN 2-12 grossly intact  Psych:  Normal mood and affect, normal judgement and insight      Vital Signs  ED Triage Vitals [07/06/24 1403]   Temperature Pulse Respirations Blood Pressure SpO2   98.4 °F (36.9 °C) 79 18 122/90 98 %      Temp Source Heart Rate Source Patient Position - Orthostatic VS BP Location FiO2 (%)   Oral Monitor Sitting Right arm --      Pain Score       4           Vitals:    07/06/24 1403   BP: 122/90   Pulse: 79   Patient Position - Orthostatic VS: Sitting         Visual Acuity      ED Medications  Medications   multi-electrolyte (ISOLYTE-S PH 7.4) bolus 1,000 mL (0 mL Intravenous Stopped 7/6/24 1552)       Diagnostic  Studies  Results Reviewed       Procedure Component Value Units Date/Time    Reticulocytes [[id number]]  (Abnormal) Collected: 07/06/24 1430    Lab Status: Final result Specimen: Blood from Hand, Right Updated: 07/06/24 1612     Retic Ct Abs 151,000     Retic Ct Pct 3.98 %     Manual Differential(PHLEBS Do Not Order) [[id number]]  (Abnormal) Collected: 07/06/24 1430    Lab Status: Final result Specimen: Blood from Hand, Right Updated: 07/06/24 1612     Segmented % 21 %      Lymphocytes % 74 %      Monocytes % 4 %      Eosinophils % 0 %      Basophils % 0 %      Atypical Lymphocytes % 1 %      Absolute Neutrophils 2.66 Thousand/uL      Absolute Lymphocytes 9.49 Thousand/uL      Absolute Monocytes 0.51 Thousand/uL      Absolute Eosinophils 0.00 Thousand/uL      Absolute Basophils 0.00 Thousand/uL      Total Counted --     Platelet Estimate Adequate     Anisocytosis Present     Helmet Cells Present     Macrocytes Present     Target Cells Present    CBC and differential [[id number]]  (Abnormal) Collected: 07/06/24 1430    Lab Status: Final result Specimen: Blood from Hand, Right Updated: 07/06/24 1545     WBC 12.65 Thousand/uL      RBC 3.80 Million/uL      Hemoglobin 12.0 g/dL      Hematocrit 32.5 %      MCV 86 fL      MCH 31.6 pg      MCHC 36.9 g/dL      RDW 14.2 %      MPV 11.6 fL      Platelets 240 Thousands/uL     Narrative:      This is an appended report.  These results have been appended to a previously verified report.    Basic metabolic panel [[id number]] Collected: 07/06/24 1430    Lab Status: Final result Specimen: Blood from Hand, Right Updated: 07/06/24 1455     Sodium 135 mmol/L      Potassium 4.4 mmol/L      Chloride 104 mmol/L      CO2 27 mmol/L      ANION GAP 4 mmol/L      BUN 12 mg/dL      Creatinine 0.84 mg/dL      Glucose 105 mg/dL      Calcium 9.2 mg/dL      eGFR 111 ml/min/1.73sq m     Narrative:      [employer] guidelines for Chronic Kidney Disease (CKD):     Stage 1 with  normal or high GFR (GFR > 90 mL/min/1.73 square meters)    Stage 2 Mild CKD (GFR = 60-89 mL/min/1.73 square meters)    Stage 3A Moderate CKD (GFR = 45-59 mL/min/1.73 square meters)    Stage 3B Moderate CKD (GFR = 30-44 mL/min/1.73 square meters)    Stage 4 Severe CKD (GFR = 15-29 mL/min/1.73 square meters)    Stage 5 End Stage CKD (GFR <15 mL/min/1.73 square meters)  Note: GFR calculation is accurate only with a steady state creatinine                   XR ribs 2 views RIGHT   ED Interpretation by Florinda Acosta DO (07/06 1520)   No acute findings    Image independently interpreted by myself       XR chest 2 views   ED Interpretation by Florinda Acosta DO (07/06 1520)   No acute disease    Image independently interpreted by myself                   Procedures  Procedures         ED Course  ED Course as of 07/06/24 1616   Sat Jul 06, 2024   1550 Hemoglobin: 12.0  Stable    1550 WBC(!): 12.65  Mild leukocytosis   1604 Pt updated on labs, pending retic count however with stable hemoglobin unlikely sickle cell crisis.  Pt reports to feeling better.  Pain likely MSK due to vomiting.  Will proceed with discharge home, recommending hematology follow up.                               SBIRT 22yo+      Flowsheet Row Most Recent Value   Initial Alcohol Screen: US AUDIT-C     1. How often do you have a drink containing alcohol? 2 Filed at: 07/06/2024 1420   2. How many drinks containing alcohol do you have on a typical day you are drinking?  1 Filed at: 07/06/2024 1420   3a. Male UNDER 65: How often do you have five or more drinks on one occasion? 0 Filed at: 07/06/2024 1420   3b. FEMALE Any Age, or MALE 65+: How often do you have 4 or more drinks on one occassion? 0 Filed at: 07/06/2024 1420   Audit-C Score 3 Filed at: 07/06/2024 1420   MARSHA: How many times in the past year have you...    Used an illegal drug or used a prescription medication for non-medical reasons? Never Filed at: 07/06/2024 1420                       Medical Decision Making  A 38 yo male presents with right inferior lateral rib pain that began after vomiting yesterday.  Vomiting has since resolved and pt is now tolerating PO.  Pt reports primary concerns for Acute Chest Syndrome.  Pt is overall well appearing with pinpoint tenderness to the rib cage, suspect pain is muscular in nature and less likely due to sickle cell.  Will check labs for anemia and XR for further evaluation.  Will give IVF.    Amount and/or Complexity of Data Reviewed  Labs: ordered. Decision-making details documented in ED Course.  Radiology: ordered and independent interpretation performed.    Risk  Prescription drug management.             Disposition  Final diagnoses:   Rib pain on right side   Sickle cell anemia (HCC)     Time reflects when diagnosis was documented in both MDM as applicable and the Disposition within this note       Time User Action Codes Description Comment    7/6/2024  4:05 PM Florinda Acosta Add [R07.81] Rib pain on right side     7/6/2024  4:05 PM Florinda Acosta Add [D57.1] Sickle cell anemia (HCC)           ED Disposition       ED Disposition   Discharge    Condition   Stable    Date/Time   Sat Jul 6, 2024 1605    Comment   Indira Mccall discharge to home/self care.                   Follow-up Information       Follow up With Specialties Details Why Contact Info Additional Information    Hematologist  Schedule an appointment as soon as possible for a visit  For re-evaluation      [facility] Emergency Department Emergency Medicine Go to  If symptoms worsen [address] [facility] [id number]  [phone] [facility] Emergency Department, [address], [place], [address]            Discharge Medication List as of 7/6/2024  4:07 PM        CONTINUE these medications which have NOT CHANGED    Details   folic acid (FOLVITE) 1 mg tablet Take by mouth daily, Historical Med      ibuprofen  (MOTRIN) 600 mg tablet Take 1 tablet (600 mg total) by mouth every 6 (six) hours as needed for moderate pain, Starting Sun 10/27/2019, Print      lidocaine (LIDODERM) 5 % Apply 1 patch topically daily Remove & Discard patch within 12 hours or as directed by MD, Starting Sun 10/27/2019, Print      oxyCODONE-acetaminophen (PERCOCET)  mg per tablet Take 1 tablet by mouth every 4 (four) hours as needed for moderate pain 30 mg q4hrs PRN, Historical Med      pseudoephedrine (SUDAFED) 60 mg tablet Take 1 tablet (60 mg total) by mouth as needed (priapism (painful erection)), Starting Sat 6/27/2020, Print             No discharge procedures on file.    PDMP Review       None            ED Provider  Electronically Signed by             Florinda Acosta DO  07/06/24 7978

## 2024-11-14 ENCOUNTER — HOSPITAL ENCOUNTER (EMERGENCY)
Facility: HOSPITAL | Age: 39
Discharge: HOME | End: 2024-11-14
Attending: EMERGENCY MEDICINE
Payer: MEDICARE

## 2024-11-14 VITALS
SYSTOLIC BLOOD PRESSURE: 115 MMHG | TEMPERATURE: 98.3 F | RESPIRATION RATE: 18 BRPM | WEIGHT: 152.12 LBS | OXYGEN SATURATION: 96 % | HEART RATE: 81 BPM | DIASTOLIC BLOOD PRESSURE: 56 MMHG

## 2024-11-14 DIAGNOSIS — H92.02 LEFT EAR PAIN: ICD-10-CM

## 2024-11-14 DIAGNOSIS — H66.90 OTITIS MEDIA: Primary | ICD-10-CM

## 2024-11-14 PROCEDURE — 96372 THER/PROPH/DIAG INJ SC/IM: CPT

## 2024-11-14 PROCEDURE — 99284 EMERGENCY DEPT VISIT MOD MDM: CPT

## 2024-11-14 PROCEDURE — 99282 EMERGENCY DEPT VISIT SF MDM: CPT

## 2024-11-14 RX ORDER — FLUTICASONE PROPIONATE 50 MCG
1 SPRAY, SUSPENSION (ML) NASAL DAILY
Qty: 16 G | Refills: 0 | Status: SHIPPED | OUTPATIENT
Start: 2024-11-14

## 2024-11-14 RX ORDER — ACETAMINOPHEN 325 MG/1
975 TABLET ORAL ONCE
Status: COMPLETED | OUTPATIENT
Start: 2024-11-14 | End: 2024-11-14

## 2024-11-14 RX ORDER — AMOXICILLIN 875 MG/1
875 TABLET, COATED ORAL 2 TIMES DAILY
Qty: 20 TABLET | Refills: 0 | Status: SHIPPED | OUTPATIENT
Start: 2024-11-14 | End: 2024-11-14

## 2024-11-14 RX ORDER — AMOXICILLIN 250 MG/1
1000 CAPSULE ORAL ONCE
Status: COMPLETED | OUTPATIENT
Start: 2024-11-14 | End: 2024-11-14

## 2024-11-14 RX ORDER — KETOROLAC TROMETHAMINE 30 MG/ML
15 INJECTION, SOLUTION INTRAMUSCULAR; INTRAVENOUS ONCE
Status: COMPLETED | OUTPATIENT
Start: 2024-11-14 | End: 2024-11-14

## 2024-11-14 RX ORDER — IBUPROFEN 600 MG/1
600 TABLET, FILM COATED ORAL EVERY 6 HOURS PRN
Qty: 30 TABLET | Refills: 0 | Status: SHIPPED | OUTPATIENT
Start: 2024-11-14

## 2024-11-14 RX ORDER — AMOXICILLIN 875 MG/1
875 TABLET, COATED ORAL 2 TIMES DAILY
Qty: 20 TABLET | Refills: 0 | Status: SHIPPED | OUTPATIENT
Start: 2024-11-14 | End: 2024-11-24

## 2024-11-14 RX ORDER — ACETAMINOPHEN 500 MG
1000 TABLET ORAL EVERY 6 HOURS PRN
Qty: 30 TABLET | Refills: 0 | Status: SHIPPED | OUTPATIENT
Start: 2024-11-14

## 2024-11-14 RX ADMIN — AMOXICILLIN 1000 MG: 250 CAPSULE ORAL at 20:11

## 2024-11-14 RX ADMIN — KETOROLAC TROMETHAMINE 15 MG: 30 INJECTION, SOLUTION INTRAMUSCULAR; INTRAVENOUS at 20:12

## 2024-11-14 RX ADMIN — ACETAMINOPHEN 975 MG: 325 TABLET, FILM COATED ORAL at 20:11

## 2024-11-15 NOTE — DISCHARGE INSTRUCTIONS
Take medication as prescribed  Follow with PCP in outpatient setting  Return to emergency room increasing fever, body aches, chills, swelling behind the ear, anterior displacement of the ear, increasing pain uncontrolled by over-the-counter analgesia, discharge from the ear, or further decrease in hearing.

## 2024-11-15 NOTE — ED PROVIDER NOTES
Time reflects when diagnosis was documented in both MDM as applicable and the Disposition within this note       Time User Action Codes Description Comment    11/14/2024  8:19 PM Alonzo Brenner [H66.90] Otitis media     11/14/2024  8:19 PM Alonzo Brenner [H92.02] Left ear pain           ED Disposition       ED Disposition   Discharge    Condition   Stable    Date/Time   Thu Nov 14, 2024  8:19 PM    Comment   Indira Mccall discharge to home/self care.                   Assessment & Plan       Medical Decision Making  Patient is a 39-year-old male with left-sided ear pain and ringing with decreased hearing x 4 days.  Patient states that he has had ear infections in the past and this feels very similar.  Physical exam shows erythematous tympanic membrane on the left ear without any erythema or swelling of the external auditory canal. DDx including but not limited to: Otitis media, otitis externa, bullous myringitis, perforated TM, impacted cerumen, eustachian tube defect, cellulitis.  Findings consistent with otitis media.  Patient started amoxicillin, Toradol, and Tylenol for further relief.  Patient prescribed amoxicillin 7-day course, as well as Motrin for around-the-clock pain medication.  Discussed with patient to take Tylenol and Motrin together for further analgesia.  Patient prescribed Flonase to cover for any eustachian tube defect.  Discussed the patient to follow with PCP in outpatient setting.  Patient given strict return precautions to return to emergency room increasing fever, body aches, chills, swelling behind the ear, anterior displacement of the ear, increasing pain uncontrolled by over-the-counter analgesia, discharge from the ear, or further decrease in hearing.  Patient verbalized understanding and agrees current plan.  Patient discharged home in stable condition this time.    Risk  OTC drugs.  Prescription drug management.             Medications   amoxicillin (AMOXIL) capsule 1,000  mg (1,000 mg Oral Given 11/14/24 2011)   ketorolac (TORADOL) injection 15 mg (15 mg Intramuscular Given 11/14/24 2012)   acetaminophen (TYLENOL) tablet 975 mg (975 mg Oral Given 11/14/24 2011)       ED Risk Strat Scores                           SBIRT 22yo+      Flowsheet Row Most Recent Value   Initial Alcohol Screen: US AUDIT-C     1. How often do you have a drink containing alcohol? 2 Filed at: 11/14/2024 1922   2. How many drinks containing alcohol do you have on a typical day you are drinking?  1 Filed at: 11/14/2024 1922   3a. Male UNDER 65: How often do you have five or more drinks on one occasion? 0 Filed at: 11/14/2024 1922   3b. FEMALE Any Age, or MALE 65+: How often do you have 4 or more drinks on one occassion? 0 Filed at: 11/14/2024 1922   Audit-C Score 3 Filed at: 11/14/2024 1922   MARSHA: How many times in the past year have you...    Used an illegal drug or used a prescription medication for non-medical reasons? Never Filed at: 11/14/2024 1922                            History of Present Illness       Chief Complaint   Patient presents with    Earache     States L ear pain, ringing and decreased hearing for 4 days.       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 No pertinent family history.   Social History     Tobacco Use    Smoking status: Every Day    Smokeless tobacco: Never   Vaping Use    Vaping status: Never Used   Substance Use Topics    Alcohol use: Yes     Comment: socially    Drug use: Never      E-Cigarette/Vaping    E-Cigarette Use Never User       E-Cigarette/Vaping Substances      I have reviewed and agree with the history as documented.     Patient is a 39-year-old male with left-sided ear pain and ringing with decreased hearing x 4 days.  Patient states that he has  had ear infections in the past  and this feels very similar.  Patient states has been taking Tylenol with little relief.  Patient describes the pain as a 5-10 throbbing pulsating pain in the left ear.  Patient denies pain with ear movement.  Patient denies any fever, body aches, chills, nasal congestion, ear discharge, sore throat, chest pain, palpitations, shortness of breath, nausea, vomiting, diarrhea, general malaise or fatigue at this time.      Earache  Associated symptoms: no abdominal pain, no congestion, no cough, no diarrhea, no ear discharge, no fever, no headaches, no neck pain, no rash, no rhinorrhea, no sore throat and no vomiting        Review of Systems   Constitutional:  Negative for chills, diaphoresis, fatigue and fever.   HENT:  Positive for ear pain. Negative for congestion, ear discharge, facial swelling, nosebleeds, postnasal drip, rhinorrhea, sinus pressure, sinus pain and sore throat.    Eyes:  Negative for pain, discharge, redness, itching and visual disturbance.   Respiratory:  Negative for cough, choking, chest tightness, shortness of breath, wheezing and stridor.    Cardiovascular:  Negative for chest pain, palpitations and leg swelling.   Gastrointestinal:  Negative for abdominal pain, constipation, diarrhea, nausea and vomiting.   Genitourinary:  Negative for dysuria and hematuria.   Musculoskeletal:  Negative for arthralgias, back pain, myalgias, neck pain and neck stiffness.   Skin:  Negative for color change and rash.   Neurological:  Negative for dizziness, seizures, syncope, weakness, light-headedness, numbness and headaches.   All other systems reviewed and are negative.          Objective       ED Triage Vitals [11/14/24 1911]   Temperature Pulse Blood Pressure Respirations SpO2 Patient Position - Orthostatic VS   98.3 °F (36.8 °C) 81 115/56 18 96 % Sitting      Temp Source Heart Rate Source BP Location FiO2 (%) Pain Score    Oral Monitor Right arm -- 8      Vitals      Date and Time Temp Pulse SpO2 Resp BP  Pain Score FACES Pain Rating User   11/14/24 2012 -- -- -- -- -- 8 -- SS   11/14/24 2011 -- -- -- -- -- 8 --    11/14/24 1911 98.3 °F (36.8 °C) 81 96 % 18 115/56 8 --             Physical Exam  Vitals and nursing note reviewed.   Constitutional:       General: He is not in acute distress.     Appearance: Normal appearance. He is well-developed. He is not ill-appearing.   HENT:      Head: Normocephalic and atraumatic.      Right Ear: External ear normal. There is no impacted cerumen.      Left Ear: External ear normal. There is no impacted cerumen. Tympanic membrane is erythematous.      Nose: Nose normal. No congestion or rhinorrhea.      Mouth/Throat:      Mouth: Mucous membranes are moist.      Pharynx: Oropharynx is clear. No oropharyngeal exudate or posterior oropharyngeal erythema.   Eyes:      General:         Right eye: No discharge.         Left eye: No discharge.      Extraocular Movements: Extraocular movements intact.      Conjunctiva/sclera: Conjunctivae normal.      Pupils: Pupils are equal, round, and reactive to light.   Cardiovascular:      Rate and Rhythm: Normal rate and regular rhythm.      Pulses: Normal pulses.      Heart sounds: Normal heart sounds. No murmur heard.     No friction rub. No gallop.   Pulmonary:      Effort: Pulmonary effort is normal. No respiratory distress.      Breath sounds: Normal breath sounds. No stridor. No wheezing, rhonchi or rales.   Chest:      Chest wall: No tenderness.   Abdominal:      General: Abdomen is flat. There is no distension.      Palpations: Abdomen is soft.      Tenderness: There is no abdominal tenderness. There is no right CVA tenderness, left CVA tenderness or guarding.   Musculoskeletal:         General: No swelling.      Cervical back: Neck supple. No rigidity or tenderness.   Lymphadenopathy:      Cervical: No cervical adenopathy.   Skin:     General: Skin is warm and dry.      Capillary Refill: Capillary refill takes less than 2 seconds.       Coloration: Skin is not jaundiced or pale.      Findings: No bruising, erythema, lesion or rash.   Neurological:      Mental Status: He is alert.   Psychiatric:         Mood and Affect: Mood normal.         Results Reviewed       None            No orders to display       Procedures    ED Medication and Procedure Management   Prior to Admission Medications   Prescriptions Last Dose Informant Patient Reported? Taking?   folic acid (FOLVITE) 1 mg tablet   Yes No   Sig: Take by mouth daily   ibuprofen (MOTRIN) 600 mg tablet   No No   Sig: Take 1 tablet (600 mg total) by mouth every 6 (six) hours as needed for moderate pain   lidocaine (LIDODERM) 5 %   No No   Sig: Apply 1 patch topically daily Remove & Discard patch within 12 hours or as directed by MD   oxyCODONE-acetaminophen (PERCOCET)  mg per tablet   Yes No   Sig: Take 1 tablet by mouth every 4 (four) hours as needed for moderate pain 30 mg q4hrs PRN   pseudoephedrine (SUDAFED) 60 mg tablet   No No   Sig: Take 1 tablet (60 mg total) by mouth as needed (priapism (painful erection))      Facility-Administered Medications: None     Discharge Medication List as of 11/14/2024  8:21 PM        START taking these medications    Details   acetaminophen (TYLENOL) 500 mg tablet Take 2 tablets (1,000 mg total) by mouth every 6 (six) hours as needed for mild pain, Starting Thu 11/14/2024, Normal      fluticasone (FLONASE) 50 mcg/act nasal spray 1 spray into each nostril daily, Starting Thu 11/14/2024, Normal      !! ibuprofen (MOTRIN) 600 mg tablet Take 1 tablet (600 mg total) by mouth every 6 (six) hours as needed for mild pain, Starting Thu 11/14/2024, Normal      amoxicillin (AMOXIL) 875 mg tablet Take 1 tablet (875 mg total) by mouth 2 (two) times a day for 10 days, Starting Thu 11/14/2024, Until Sun 11/24/2024, Print       !! - Potential duplicate medications found. Please discuss with provider.        CONTINUE these medications which have NOT CHANGED    Details    folic acid (FOLVITE) 1 mg tablet Take by mouth daily, Historical Med      !! ibuprofen (MOTRIN) 600 mg tablet Take 1 tablet (600 mg total) by mouth every 6 (six) hours as needed for moderate pain, Starting Sun 10/27/2019, Print      lidocaine (LIDODERM) 5 % Apply 1 patch topically daily Remove & Discard patch within 12 hours or as directed by MD, Starting Sun 10/27/2019, Print      oxyCODONE-acetaminophen (PERCOCET)  mg per tablet Take 1 tablet by mouth every 4 (four) hours as needed for moderate pain 30 mg q4hrs PRN, Historical Med      pseudoephedrine (SUDAFED) 60 mg tablet Take 1 tablet (60 mg total) by mouth as needed (priapism (painful erection)), Starting Sat 6/27/2020, Print       !! - Potential duplicate medications found. Please discuss with provider.        No discharge procedures on file.  ED SEPSIS DOCUMENTATION   Time reflects when diagnosis was documented in both MDM as applicable and the Disposition within this note       Time User Action Codes Description Comment    11/14/2024  8:19 PM Alonzo Brenner [H66.90] Otitis media     11/14/2024  8:19 PM Alonzo Brenner [H92.02] Left ear pain                  Alonzo Brenner PA-C  11/15/24 [id number]

## 2025-05-29 ENCOUNTER — HOSPITAL ENCOUNTER (EMERGENCY)
Facility: HOSPITAL | Age: 40
Discharge: HOME | End: 2025-05-29
Payer: MEDICARE

## 2025-05-29 VITALS
TEMPERATURE: 98.2 F | HEART RATE: 98 BPM | DIASTOLIC BLOOD PRESSURE: 67 MMHG | SYSTOLIC BLOOD PRESSURE: 117 MMHG | RESPIRATION RATE: 16 BRPM | OXYGEN SATURATION: 98 % | WEIGHT: 149.91 LBS

## 2025-05-29 DIAGNOSIS — Z20.2 POSSIBLE EXPOSURE TO STD: Primary | ICD-10-CM

## 2025-05-29 LAB
BILIRUB UR QL STRIP: NEGATIVE
C TRACH DNA SPEC QL NAA+PROBE: NEGATIVE
CLARITY UR: CLEAR
COLOR UR: ABNORMAL
GLUCOSE UR STRIP-MCNC: NEGATIVE MG/DL
HGB UR QL STRIP.AUTO: NEGATIVE
KETONES UR STRIP-MCNC: NEGATIVE MG/DL
LEUKOCYTE ESTERASE UR QL STRIP: NEGATIVE
N GONORRHOEA DNA SPEC QL NAA+PROBE: NEGATIVE
NITRITE UR QL STRIP: NEGATIVE
PH UR STRIP.AUTO: 6 [PH]
PROT UR STRIP-MCNC: NEGATIVE MG/DL
SP GR UR STRIP.AUTO: 1.01 (ref 1–1.03)
UROBILINOGEN UR STRIP-ACNC: 3 MG/DL

## 2025-05-29 PROCEDURE — 99284 EMERGENCY DEPT VISIT MOD MDM: CPT

## 2025-05-29 PROCEDURE — 87591 N.GONORRHOEAE DNA AMP PROB: CPT

## 2025-05-29 PROCEDURE — 99283 EMERGENCY DEPT VISIT LOW MDM: CPT

## 2025-05-29 PROCEDURE — 87491 CHLMYD TRACH DNA AMP PROBE: CPT

## 2025-05-29 PROCEDURE — 96372 THER/PROPH/DIAG INJ SC/IM: CPT

## 2025-05-29 PROCEDURE — 81003 URINALYSIS AUTO W/O SCOPE: CPT

## 2025-05-29 RX ORDER — DOXYCYCLINE 100 MG/1
100 CAPSULE ORAL 2 TIMES DAILY
Qty: 14 CAPSULE | Refills: 0 | Status: SHIPPED | OUTPATIENT
Start: 2025-05-29 | End: 2025-06-05

## 2025-05-29 RX ORDER — DOXYCYCLINE 100 MG/1
100 CAPSULE ORAL ONCE
Status: COMPLETED | OUTPATIENT
Start: 2025-05-29 | End: 2025-05-29

## 2025-05-29 RX ADMIN — DOXYCYCLINE HYCLATE 100 MG: 100 CAPSULE ORAL at 01:03

## 2025-05-29 RX ADMIN — LIDOCAINE HYDROCHLORIDE 500 MG: 10 INJECTION, SOLUTION EPIDURAL; INFILTRATION; INTRACAUDAL; PERINEURAL at 01:05

## 2025-05-29 NOTE — ED PROVIDER NOTES
ED Disposition       None          Assessment & Plan       Medical Decision Making  Patient with history as below presented with concern for possible STD. Patient presents hemodynamically stable with vitals within normal limits. I considered the patient's chronic medical conditions including their sickle cell disease in forming my differential diagnosis, plan, and my medical decision making. I also reviewed their external records including office visit 4/24/2025. History obtained from patient.    After initial evaluation differential diagnosis includes: Exposure to STD, urinary tract infection.     Plan: Initial testing to include urinalysis, gonorrhea/chlamydia. Initial therapeutics to include empiric treatment with ceftriaxone, doxycycline.     After initial evaluation and testing, urinalysis without signs of infection.  Gonorrhea/chlamydia sent off and pending.  Clinically, exam not consistent with priapism as penis is not erect and tenderness is limited to the urethral opening, patient also reports this feels nothing like his previous priapism.  Patient's main concern is that he could have been exposed to an STD.  I offered empiric treatment while his urine is pending which he is in agreement with.  Ceftriaxone and initial dose of doxycycline given here in the emergency department.  Reassessed the patient and they continue to be well appearing. Given reassuring clinical exam I suspect the patient's presentation is due to a nonemergent cause that can be followed up closely as an outpatient, possibly exposure to STD.  Doxycycline sent to pharmacy. Stable for outpatient management.    Disposition: Discharged with instructions to obtain outpatient follow up of patient's symptoms and findings, with strict return precautions if patient develops new or worsening symptoms. Patient understands this plan and is agreeable. All questions answered. Patient discharged home with return precautions.    Amount and/or  Complexity of Data Reviewed  Labs: ordered.    Risk  Prescription drug management.             Medications - No data to display    ED Risk Strat Scores                    No data recorded                            History of Present Illness       Chief Complaint   Patient presents with    Exposure to STD     Pt reports throbbing sensation to head of penis that started yesterday morning. Pt states he had intercourse on Sunday and the condom broke and is concerned for exposure to an STD/STI. Pt denies urinary symptoms.        Past Medical History[1]   Past Surgical History[2]   Family History[3]   Social History[4]   E-Cigarette/Vaping    E-Cigarette Use Never User       E-Cigarette/Vaping Substances      I have reviewed and agree with the history as documented.     Patient is a 40-year-old male with significant past medical history of sickle cell anemia, presenting for evaluation of possible STD exposure.  Patient reports that about 4 days ago he had sexual intercourse and was using condom which broke.  He says that he is concerned that he may have been exposed to an STD because he has been having some throbbing near his urethra as well as some mild dysuria.  He denies any discharge from his penis.  He denies any rashes.  He denies any testicular pain.  He says that this does not feel anything like his priapism that he has had in the past and says that he has had no difficulty with prolonged erection.  He is otherwise without acute complaint.        Review of Systems   Genitourinary:  Positive for dysuria and penile pain. Negative for difficulty urinating, hematuria, penile discharge and testicular pain.           Objective       ED Triage Vitals   Temperature Pulse Blood Pressure Respirations SpO2 Patient Position - Orthostatic VS   05/29/25 0036 05/29/25 0036 05/29/25 0039 05/29/25 0036 05/29/25 0036 05/29/25 0036   98.2 °F (36.8 °C) 98 117/67 16 98 % Sitting      Temp Source Heart Rate Source BP Location FiO2 (%)  Pain Score    05/29/25 0036 05/29/25 0036 05/29/25 0036 -- --    Oral Monitor Right arm        Vitals      Date and Time Temp Pulse SpO2 Resp BP Pain Score FACES Pain Rating User   05/29/25 0039 -- -- -- -- 117/67 -- -- [place]   05/29/25 0036 98.2 °F (36.8 °C) 98 98 % 16 -- -- -- [place]            Physical Exam  Vitals and nursing note reviewed. Exam conducted with a chaperone present (Michael Aragon RN).   Constitutional:       General: He is not in acute distress.     Appearance: Normal appearance. He is not ill-appearing or toxic-appearing.   HENT:      Head: Normocephalic and atraumatic.      Right Ear: External ear normal.      Left Ear: External ear normal.      Nose: Nose normal.     Eyes:      General: No scleral icterus.        Right eye: No discharge.         Left eye: No discharge.      Extraocular Movements: Extraocular movements intact.      Conjunctiva/sclera: Conjunctivae normal.       Cardiovascular:      Rate and Rhythm: Normal rate.      Heart sounds: Normal heart sounds. No murmur heard.     No friction rub. No gallop.   Pulmonary:      Effort: Pulmonary effort is normal. No respiratory distress.      Breath sounds: Normal breath sounds.   Abdominal:      General: Abdomen is flat. There is no distension.      Palpations: Abdomen is soft. There is no mass.      Tenderness: There is no abdominal tenderness.   Genitourinary:     Penis: Normal.       Testes: Normal.      Comments: The penis is flaccid, circumcised.  Minimal tenderness to the urethral opening without any discharge or lesions.  No testicular pain to palpation or high riding testicle.  Normal scrotum.    Skin:     General: Skin is warm and dry.     Neurological:      General: No focal deficit present.      Mental Status: He is alert.         Results Reviewed       None            No orders to display       Procedures    ED Medication and Procedure Management   Prior to Admission Medications   Prescriptions Last Dose Informant Patient  Reported? Taking?   acetaminophen (TYLENOL) 500 mg tablet   No No   Sig: Take 2 tablets (1,000 mg total) by mouth every 6 (six) hours as needed for mild pain   fluticasone (FLONASE) 50 mcg/act nasal spray   No No   Si spray into each nostril daily   folic acid (FOLVITE) 1 mg tablet   Yes No   Sig: Take by mouth daily   ibuprofen (MOTRIN) 600 mg tablet   No No   Sig: Take 1 tablet (600 mg total) by mouth every 6 (six) hours as needed for moderate pain   ibuprofen (MOTRIN) 600 mg tablet   No No   Sig: Take 1 tablet (600 mg total) by mouth every 6 (six) hours as needed for mild pain   lidocaine (LIDODERM) 5 %   No No   Sig: Apply 1 patch topically daily Remove & Discard patch within 12 hours or as directed by MD   oxyCODONE-acetaminophen (PERCOCET)  mg per tablet   Yes No   Sig: Take 1 tablet by mouth every 4 (four) hours as needed for moderate pain 30 mg q4hrs PRN   pseudoephedrine (SUDAFED) 60 mg tablet   No No   Sig: Take 1 tablet (60 mg total) by mouth as needed (priapism (painful erection))      Facility-Administered Medications: None     Patient's Medications   Discharge Prescriptions    No medications on file     No discharge procedures on file.  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  [4]   Social History  Tobacco Use    Smoking status: Every Day    Smokeless tobacco: Never   Vaping Use    Vaping status: Never Used   Substance Use Topics    Alcohol use: Yes     Comment: socially    Drug use: Never        Alexx Huitron DO  [id number]

## 2025-05-29 NOTE — DISCHARGE INSTRUCTIONS
Please follow up with your primary care physician within one week.    Take the antibiotic as prescribed.    Return to the Emergency Department if you experience worsening or concerning symptoms.    Thank you for choosing us for your care.